# Patient Record
Sex: MALE | Race: WHITE | Employment: OTHER | URBAN - METROPOLITAN AREA
[De-identification: names, ages, dates, MRNs, and addresses within clinical notes are randomized per-mention and may not be internally consistent; named-entity substitution may affect disease eponyms.]

---

## 2019-01-16 ENCOUNTER — INITIAL CONSULT (OUTPATIENT)
Dept: ONCOLOGY | Age: 64
End: 2019-01-16
Payer: COMMERCIAL

## 2019-01-16 DIAGNOSIS — C61 PROSTATE CANCER (HCC): Primary | ICD-10-CM

## 2019-01-16 DIAGNOSIS — Z80.42 FAMILY HISTORY OF PROSTATE CANCER: ICD-10-CM

## 2019-01-16 PROCEDURE — 96040 PR GENETIC COUNSELING, EACH 30 MIN: CPT | Performed by: GENETIC COUNSELOR, MS

## 2019-02-04 ENCOUNTER — TELEPHONE (OUTPATIENT)
Dept: ONCOLOGY | Age: 64
End: 2019-02-04

## 2019-02-05 ENCOUNTER — HOSPITAL ENCOUNTER (OUTPATIENT)
Facility: MEDICAL CENTER | Age: 64
End: 2019-02-05
Payer: COMMERCIAL

## 2019-02-06 ENCOUNTER — OFFICE VISIT (OUTPATIENT)
Dept: ONCOLOGY | Age: 64
End: 2019-02-06
Payer: COMMERCIAL

## 2019-02-06 DIAGNOSIS — Z80.42 FAMILY HISTORY OF PROSTATE CANCER: ICD-10-CM

## 2019-02-06 DIAGNOSIS — C61 PROSTATE CANCER (HCC): Primary | ICD-10-CM

## 2019-02-06 DIAGNOSIS — Z15.03 BRCA2 GENE MUTATION POSITIVE IN MALE: ICD-10-CM

## 2019-02-06 DIAGNOSIS — Z15.01 BRCA2 GENE MUTATION POSITIVE IN MALE: ICD-10-CM

## 2019-02-06 DIAGNOSIS — Z15.09 BRCA2 GENE MUTATION POSITIVE IN MALE: ICD-10-CM

## 2019-02-06 PROCEDURE — 96040 PR GENETIC COUNSELING, EACH 30 MIN: CPT | Performed by: GENETIC COUNSELOR, MS

## 2019-06-18 ENCOUNTER — TELEPHONE (OUTPATIENT)
Dept: ONCOLOGY | Age: 64
End: 2019-06-18

## 2019-06-18 NOTE — TELEPHONE ENCOUNTER
Received call from patient's daughter who was a patient of mine. She is asking if I can help her get a 2nd opinion for her father Herberth Marin. She left me her mother Chari's number. I called Chari and spoke with her. Explained who I am and what Herson Carson had told me. Keisha Bajwa relates she would like a second opinion at the [facility] with Sarita's doctors. I explained what the med onc and rad onc do and gave her Dr. Amira Mederos and Dr. Praag Carias names as the med onc and rad onc that Sarita saw. I explained that a  would call her tomorrow with appointment date and time and that we will try to set up dual appointments. Keisha Bajwa is thankful.   She relates she works until 2:00pm so if we could call after 2:30pm would be best.

## 2019-06-20 ENCOUNTER — TELEPHONE (OUTPATIENT)
Dept: RADIATION ONCOLOGY | Age: 64
End: 2019-06-20

## 2019-06-20 ENCOUNTER — TELEPHONE (OUTPATIENT)
Dept: ONCOLOGY | Age: 64
End: 2019-06-20

## 2019-06-20 NOTE — TELEPHONE ENCOUNTER
Per Pam Rahman nurse navigator at [facility] pt is seeking a second opinion for his prostate ca. I called to schedule pt no answer message left. Also pt has not records in our system need additional information.

## 2019-06-20 NOTE — TELEPHONE ENCOUNTER
Called patients home number trying to reach Taisha. I was actually calling his cell phone. I explained who I am and why I was calling. He asked me to call his wife, she keeps his schedule. I called Chari left message and apologized for calling Yordan's cell instead of hers. I explained that we can see him on 6/28/19 med onc and rad onc at [facility].  1130 with rad onc and will follow with med onc. I also let her know that Donte Rosales said he works 6/28/19. He related he is off tomorrow and Monday. I think I could get him in on Monday with med onc but I do not think I will be able to do the rad onc appointment.    I told her I would call her back tomorrow

## 2019-06-27 ENCOUNTER — TELEPHONE (OUTPATIENT)
Dept: ONCOLOGY | Age: 64
End: 2019-06-27

## 2019-07-09 ENCOUNTER — TELEPHONE (OUTPATIENT)
Dept: ONCOLOGY | Age: 64
End: 2019-07-09

## 2019-07-23 ENCOUNTER — TELEPHONE (OUTPATIENT)
Dept: ONCOLOGY | Age: 64
End: 2019-07-23

## 2020-08-12 ENCOUNTER — OFFICE VISIT (OUTPATIENT)
Dept: CARDIOLOGY CLINIC | Age: 65
End: 2020-08-12
Payer: MEDICARE

## 2020-08-12 ENCOUNTER — HOSPITAL ENCOUNTER (OUTPATIENT)
Dept: NON INVASIVE DIAGNOSTICS | Age: 65
Discharge: HOME | End: 2020-08-12
Payer: MEDICARE

## 2020-08-12 VITALS
OXYGEN SATURATION: 96 % | HEART RATE: 60 BPM | DIASTOLIC BLOOD PRESSURE: 80 MMHG | SYSTOLIC BLOOD PRESSURE: 140 MMHG | WEIGHT: 224 LBS

## 2020-08-12 PROCEDURE — 1036F TOBACCO NON-USER: CPT | Performed by: INTERNAL MEDICINE

## 2020-08-12 PROCEDURE — G8421 BMI NOT CALCULATED: HCPCS | Performed by: INTERNAL MEDICINE

## 2020-08-12 PROCEDURE — 3017F COLORECTAL CA SCREEN DOC REV: CPT | Performed by: INTERNAL MEDICINE

## 2020-08-12 PROCEDURE — 99203 OFFICE O/P NEW LOW 30 MIN: CPT | Performed by: INTERNAL MEDICINE

## 2020-08-12 PROCEDURE — 1123F ACP DISCUSS/DSCN MKR DOCD: CPT | Performed by: INTERNAL MEDICINE

## 2020-08-12 PROCEDURE — 93270 REMOTE 30 DAY ECG REV/REPORT: CPT

## 2020-08-12 PROCEDURE — G8427 DOCREV CUR MEDS BY ELIG CLIN: HCPCS | Performed by: INTERNAL MEDICINE

## 2020-08-12 PROCEDURE — 4040F PNEUMOC VAC/ADMIN/RCVD: CPT | Performed by: INTERNAL MEDICINE

## 2020-08-12 RX ORDER — SIMVASTATIN 20 MG
20 TABLET ORAL NIGHTLY
COMMUNITY

## 2020-08-12 RX ORDER — ASPIRIN 81 MG/1
81 TABLET ORAL DAILY
COMMUNITY

## 2020-08-12 RX ORDER — VENLAFAXINE HYDROCHLORIDE 150 MG/1
150 TABLET, EXTENDED RELEASE ORAL
COMMUNITY

## 2020-08-12 RX ORDER — QUETIAPINE FUMARATE 100 MG/1
300 TABLET, FILM COATED ORAL NIGHTLY
COMMUNITY

## 2020-08-12 RX ORDER — GABAPENTIN 300 MG/1
300 CAPSULE ORAL 2 TIMES DAILY
COMMUNITY
End: 2021-10-27

## 2020-08-12 RX ORDER — ENZALUTAMIDE 40 MG/1
CAPSULE ORAL
COMMUNITY

## 2020-08-12 RX ORDER — MORPHINE SULFATE 30 MG/1
30 TABLET ORAL EVERY 4 HOURS PRN
COMMUNITY

## 2020-08-12 RX ORDER — OXYCODONE HYDROCHLORIDE AND ACETAMINOPHEN 5; 325 MG/1; MG/1
1 TABLET ORAL EVERY 4 HOURS PRN
COMMUNITY

## 2020-08-12 RX ORDER — LANOLIN ALCOHOL/MO/W.PET/CERES
10 CREAM (GRAM) TOPICAL NIGHTLY
COMMUNITY

## 2020-08-12 RX ORDER — TRAZODONE HYDROCHLORIDE 150 MG/1
200 TABLET ORAL NIGHTLY
COMMUNITY

## 2020-08-12 RX ORDER — HYDROCHLOROTHIAZIDE 25 MG/1
25 TABLET ORAL DAILY
COMMUNITY

## 2020-08-12 RX ORDER — SODIUM BICARBONATE 650 MG/1
650 TABLET ORAL 2 TIMES DAILY
COMMUNITY

## 2020-08-12 RX ORDER — AMLODIPINE BESYLATE 10 MG/1
10 TABLET ORAL DAILY
COMMUNITY

## 2020-08-12 SDOH — HEALTH STABILITY: MENTAL HEALTH: HOW OFTEN DO YOU HAVE A DRINK CONTAINING ALCOHOL?: NEVER

## 2020-08-12 NOTE — PROGRESS NOTES
The patient and wife were educated on the use of an event monitor. The patient's comprehension was high. The patient was able to verbalize recall. The patient was instructed on how and when to return the monitor.

## 2020-08-12 NOTE — PROGRESS NOTES
Ov DR Kerry Henderson   Establish care  C/o sharp pain in chest   Lt arm comes and goes  When gets up has   Some dizziness especially  With pain   Just finished a week of  Radiation. Pain lower back down legs. Being treated for prostate cancer  With mets to bones. Getting palliative care    Getting propolia every 6 weeks. Needing cleared to do radiation to   Ribs. Has sleep apnea uses cpap  Pt children Austen living in [facility] works for   [phone] physical therapy in [place],  [facility] disable eye cancer , Kasandra May worked at [employer] [employer] let go. .  Will do stress test and event  Monitor.   Bedside echo done  Will do lexiscan stress test   And event monitor   Call with event monitor results  Will see pt after stress test   To give results

## 2020-08-13 NOTE — PROGRESS NOTES
Sara Fletcher M.D. [address] [place], [place] [address]   [phone]        2020        [facility]  [address] [facility]  [place], [address]    RE:   Aria Landry  :  1955    Dear Dmitriy Orozco:    CHIEF COMPLAINT:  1. Chest pain. 2.  Lightheadedness with syncopal episodes. 3.  Coronary artery disease. HISTORY OF PRESENT ILLNESS:  I had the pleasure of seeing Mr. Lisa Dhaliwal and his wife, Jono Rodriguez, in our office on 2020. He is a pleasant 78-year-old gentleman who has a history of cardiac catheterization approximately 20 years ago in [place], where he had approximately 50% disease in the LAD. He has had no subsequent cardiac catheterizations. He was found to have prostate surgery, diagnosed on 2018. He was placed on EBRT radiation from 2018 to 2018, with another additional 21 days of radiation. He then had androgen deprivation therapy with Eligard 45 mcg subcu every 6 months per Dr. Carlos Beltran, which started after radiation. On 2019, his PSA elevated and he was found to have metastatic disease to bones on CT and bone scan in 2019. He was started on Zytiga and was transferred to the care of Dr. Sissy Vicente on 2019. He was placed on enzalutamide 160 mg daily in 2019 and placed on hold on 2019, due to intractable fatigue and hot flashes and dizziness. He then had Taxotere treatment on , but this was stopped and he was resumed on enzalutamide 120 mg on . He was admitted to [facility] on 2019, with numbness and chest pain. His EKG at that time showed anterior T-wave inversion suggesting the possibility of ischemia. His enzymes, however, were negative. He was discharged 1 day later with the intent of doing an outpatient stress test.  He went home and the stress test was never done.     Following his hospitalization, he was placed on enzalutamide on 06/17/2019, and had monthly Xgeva 120 mg every 6 weeks, starting on 02/27/2020. He had progression of his metastatic disease to his back with osteoblastic lesions on the clavicle and the left scapula, manubrium, ribs, thoracolumbar spine, pelvis on a CT scan on 06/15/2020. He saw Dr. Milagros Mora on 06/17/2020, and his wife reported that he had had several episodes of \"passing out. \"  He also had multiple episodes of dizziness. He denied palpitations. He had developed some chest pain but it had atypical characteristics. Because of his syncopal episodes, Dr. Milagros Mora had recommended cardiac evaluation. He and his wife chose to come to see me. Mr. Rafa Abdi is in a fair amount of pain and is on palliative care for pain management. He is able to do some walking at home. He has had no further syncopal episodes but he has had multiple episodes of lightheadedness and dizziness, which last for 10 to 15 minutes then subside. He has had no PND, orthopnea and no pedal edema. Again, he cannot feel any palpitations. He had radiation of his right and left iliac spine on 08/03/2020 to 08/07/2020 for pain in his back. He just finished radiation on his back. He is needing to have radiation to his ribs but needs to be seen and cleared per us from a cardiac standpoint. CARDIAC RISK FACTORS:  Known CAD:  Positive. Hypertension:  Positive. Diabetes:  Positive, non-insulin-dependent. Hyperlipidemia:  Positive. Smoking:  Negative. Other Family Members:  Positive. Peripheral Vascular Disease:  Negative. MEDICATIONS AT THIS TIME:  He is on Norvasc 10 mg daily, aspirin 81 mg daily, enzalutamide (Xtandi) 40 mg daily, Neurontin 300 mg t.i.d., HydroDIURIL 25 mg daily, metformin 500 mg 2 tablets b.i.d., morphine tablets p.r.n., Percocet p.r.n., Seroquel 100 mg daily, Zocor 20 mg daily, sodium bicarbonate 650 mg q.i.d., Desyrel 150 mg nightly, and venlafaxine 150 mg daily.     PAST MEDICAL S2 were normal.  No S3 or S4. Soft systolic blowing type murmur. No diastolic murmur. PMI was normal.  No lift, thrust, or pericardial friction rub. LUNGS:  Quite clear to auscultation and percussion. ABDOMEN:  Soft and nontender. Good bowel sounds. The aorta was not enlarged. No hepatomegaly, splenomegaly. EXTREMITIES:  Good femoral pulses. Good pedal pulses. No pedal edema. Skin was warm and dry. No calf tenderness. Nail beds pink. Good cap refill. PULSES:  Bilateral symmetrical radial, brachial and carotid pulses. No carotid bruits. Good femoral and pedal pulses. NEUROLOGIC EXAM:  Within normal limits. PSYCHIATRIC EXAM:  Within normal limits. LABORATORY DATA:  From 07/29/2020, sodium 135, potassium 4.6. AST was 20, ALT was 19. PSA was 1.820. Glucose 108, BUN 16, creatinine 0.94. I do not have his latest CBC. Hemoglobin A1c was 6.0. Cholesterol was 204 with HDL of 43, triglycerides 250, . His EKG showed sinus rhythm with T-wave inversion in V1 through V3, which has been present and is an old finding, no acute changes. CT scan on 06/15/2020, showed bony metastases on right ischial tuberosity, left femoral neck, iliac bones bilaterally, throughout the thoracolumbar spine, ribs bilaterally with a focal metastasis point on the clavicle and left scapula, also showed ill-defined ground-glass attenuation on the left lung apex, which had been present previously. I did a beside echocardiogram.  LV appeared to be of normal size and normal function. Ejection fraction in the 50% to 55% range. I had difficulty seeing his right-sided chambers, although they appeared to be mildly dilated from his underlying sleep apnea. IMPRESSION:  1. Episodes of syncope in June, associated with multiple episodes of dizziness, which continued, lasting for 10 to 15 minutes, rule out arrhythmia.    2.  Chest pain, quite atypical and most likely musculoskeletal, although with his known coronary artery disease, cannot rule out an element of angina. 3.  Status post cardiac catheterization 20 years ago in [place], with approximately 50% LAD lesion, with unremarkable right coronary artery and circumflex, and normal LV function. 4.  Chest pain in [place] on 08/30/2019, with negative enzymes and EKG unchanged with T-wave inversion, V1, V2 and V3, which he continues to have, with recommendation for a stress test as an outpatient, which was not done. 5.  Non-insulin-dependent diabetes, under excellent control, with hemoglobin A1c of 6.0.  6.  Hypertension, well controlled. 7.  Hyperlipidemia, well controlled. 8.  Prostate cancer, being found on 04/20/2018, with bone metastases throughout multiple sites in the iliac spine, left clavicle, left scapula, and his ribs. 9.  Status post 44 treatments of radiation from April until 09/2018. 10.  Metastases to his bone on CT and bone scan, discovered in 06/2019. 11. On chemotherapy. 12.  Severe rib pain, spine pain, with him having radiation from 08/03 through 08/07, with an intent of doing radiation to his ribs if cleared by myself. 13.  Normal LV function. PLAN:  1. Recommend a 30-day event recorder. 2.  We will do a Lexiscan stress test to see if there is underlying ischemia, which will help us with treatment of his chest pain. 3.  He is cleared to have continued radiation to his ribs from a cardiac standpoint. DISCUSSION:  Mr. Smita Johnson is quite complex. He does have documented coronary artery disease, although it was mild 20 years ago on his catheterization. His main issue, of course, is his prostate cancer with metastases to multiple bone sites, in his spine, iliac, femur, scapula, clavicle. He is now on palliative care for pain control and is undergoing radiation to his spine and ribs for pain control. He did have episodes of syncope in June with multiple episodes of lightheadedness and dizziness.   Of course, the possibility of arrhythmia is high, and therefore, we will do a 30-day event recorder. Again, his chest pain is quite atypical, but it will be helpful to know if he has underlying ischemia or if he appears to have normal perfusion of his myocardium. If his stress test is abnormal, we would add a very low dose of nitrate such as a half a tablet of Imdur daily. I will have his wife take his blood pressure at home. If he would have a lightheaded spell, having her take the blood pressure during that time would also be helpful to know whether these are episodes of hypotension or whether his blood pressure is good during that time. He appears to have normal LV function on his beside echocardiogram.  We, of course, would not do any invasive procedures. We are attempting to identify any cardiac reason for his lightheaded episodes, such as arrhythmia, to help us with his management and also attempting to see if there is ischemia present, to help with medications. There is no problem from a cardiac standpoint, with him continuing with radiation to his ribs for pain control. The event recorder will be placed today. We will schedule the Lexiscan stress test and I will meet with him afterwards to go over the results. Thank you very much for allowing me the privilege of seeing Mr. Jun Hdz. I will have a followup letter following his 30-day event recorder and his Lexiscan stress test, any further recommendations from a cardiac standpoint. If you have any questions regarding my thoughts, please do not hesitate to contact me. Sincerely,        Juan Barrera    D: 08/13/2020 3:24:04     T: 08/13/2020 6:46:19     [email or web address]  Job#: [id number]   Doc#: [id number]      CC:  Eugenia GARRETT  [address]

## 2020-08-19 ENCOUNTER — HOSPITAL ENCOUNTER (OUTPATIENT)
Dept: NUCLEAR MEDICINE | Age: 65
Discharge: HOME | End: 2020-08-21
Payer: MEDICARE

## 2020-08-19 ENCOUNTER — HOSPITAL ENCOUNTER (OUTPATIENT)
Dept: NON INVASIVE DIAGNOSTICS | Age: 65
Discharge: HOME | End: 2020-08-19
Payer: MEDICARE

## 2020-08-19 ENCOUNTER — OFFICE VISIT (OUTPATIENT)
Dept: CARDIOLOGY CLINIC | Age: 65
End: 2020-08-19
Payer: MEDICARE

## 2020-08-19 VITALS — HEART RATE: 58 BPM | DIASTOLIC BLOOD PRESSURE: 66 MMHG | SYSTOLIC BLOOD PRESSURE: 142 MMHG

## 2020-08-19 PROCEDURE — G8427 DOCREV CUR MEDS BY ELIG CLIN: HCPCS | Performed by: INTERNAL MEDICINE

## 2020-08-19 PROCEDURE — 93017 CV STRESS TEST TRACING ONLY: CPT

## 2020-08-19 PROCEDURE — 1123F ACP DISCUSS/DSCN MKR DOCD: CPT | Performed by: INTERNAL MEDICINE

## 2020-08-19 PROCEDURE — 6360000002 HC RX W HCPCS: Performed by: INTERNAL MEDICINE

## 2020-08-19 PROCEDURE — 3430000000 HC RX DIAGNOSTIC RADIOPHARMACEUTICAL: Performed by: INTERNAL MEDICINE

## 2020-08-19 PROCEDURE — 1036F TOBACCO NON-USER: CPT | Performed by: INTERNAL MEDICINE

## 2020-08-19 PROCEDURE — 78452 HT MUSCLE IMAGE SPECT MULT: CPT

## 2020-08-19 PROCEDURE — A9500 TC99M SESTAMIBI: HCPCS | Performed by: INTERNAL MEDICINE

## 2020-08-19 PROCEDURE — 4040F PNEUMOC VAC/ADMIN/RCVD: CPT | Performed by: INTERNAL MEDICINE

## 2020-08-19 PROCEDURE — 99212 OFFICE O/P EST SF 10 MIN: CPT | Performed by: INTERNAL MEDICINE

## 2020-08-19 PROCEDURE — 93225 XTRNL ECG REC<48 HRS REC: CPT

## 2020-08-19 PROCEDURE — 3017F COLORECTAL CA SCREEN DOC REV: CPT | Performed by: INTERNAL MEDICINE

## 2020-08-19 PROCEDURE — G8421 BMI NOT CALCULATED: HCPCS | Performed by: INTERNAL MEDICINE

## 2020-08-19 PROCEDURE — 2580000003 HC RX 258: Performed by: INTERNAL MEDICINE

## 2020-08-19 PROCEDURE — 93226 XTRNL ECG REC<48 HR SCAN A/R: CPT

## 2020-08-19 PROCEDURE — 78452 HT MUSCLE IMAGE SPECT MULT: CPT | Performed by: INTERNAL MEDICINE

## 2020-08-19 RX ORDER — AMINOPHYLLINE DIHYDRATE 25 MG/ML
50 INJECTION, SOLUTION INTRAVENOUS
Status: DISCONTINUED | OUTPATIENT
Start: 2020-08-19 | End: 2020-08-19 | Stop reason: HOSPADM

## 2020-08-19 RX ORDER — 0.9 % SODIUM CHLORIDE 0.9 %
10 VIAL (ML) INJECTION PRN
Status: DISCONTINUED | OUTPATIENT
Start: 2020-08-19 | End: 2020-08-20 | Stop reason: HOSPADM

## 2020-08-19 RX ORDER — AMINOPHYLLINE DIHYDRATE 25 MG/ML
100 INJECTION, SOLUTION INTRAVENOUS
Status: DISCONTINUED | OUTPATIENT
Start: 2020-08-19 | End: 2020-08-19 | Stop reason: HOSPADM

## 2020-08-19 RX ORDER — ISOSORBIDE MONONITRATE 30 MG/1
15 TABLET, EXTENDED RELEASE ORAL DAILY
Qty: 30 TABLET | Refills: 11 | Status: SHIPPED | OUTPATIENT
Start: 2020-08-19 | End: 2021-06-15 | Stop reason: SDUPTHER

## 2020-08-19 RX ADMIN — TETRAKIS(2-METHOXYISOBUTYLISOCYANIDE)COPPER(I) TETRAFLUOROBORATE 10 MILLICURIE: 1 INJECTION, POWDER, LYOPHILIZED, FOR SOLUTION INTRAVENOUS at 10:36

## 2020-08-19 RX ADMIN — Medication 10 ML: at 10:59

## 2020-08-19 RX ADMIN — TETRAKIS(2-METHOXYISOBUTYLISOCYANIDE)COPPER(I) TETRAFLUOROBORATE 30 MILLICURIE: 1 INJECTION, POWDER, LYOPHILIZED, FOR SOLUTION INTRAVENOUS at 10:36

## 2020-08-19 RX ADMIN — REGADENOSON 0.4 MG: 0.08 INJECTION, SOLUTION INTRAVENOUS at 10:59

## 2020-08-19 NOTE — PROCEDURES
[place] [address]                              CARDIAC STRESS TEST    PATIENT NAME: Bessie Zurita                  :        1955  MED REC NO:   [id number]                              ROOM:  ACCOUNT NO:   [de-identified]                           ADMIT DATE: 2020  PROVIDER:     Dayna Machuca Benjamin [place] OF STUDY:  2020    Cardiovascular Diagnostics Department    Ordering Provider:  Luli Mirza MD    Primary Care Provider:  Vesta Rangel NP    Interpreting Physician:  Dayna Han MD    MYOCARDIAL PERFUSION STRESS IMAGING    The stress ECG results are reported separately. NUCLEAR IMAGING RESULTS:  The overall quality of the study is fair. No  significant attenuation artifact was seen. There is no evidence of  abnormal lung uptake. Additionally, the right ventricle appears normal.  The left ventricular cavity is noted to be normal in size on stress  images. There is evidence of transient ischemic dilatation (TID) of the  left ventricle (1.28). Gated SPECT imaging demonstrates hypokinesis of the inferior region. The calculated left ventricular ejection fraction was 48%. The rest images demonstrated a small/moderate perfusion abnormality of  mild/moderate intensity in the inferior region(s) which may be due to  artifact. On stress imaging, a small/moderate perfusion abnormality of  mild/moderate intensity was noted in the inferior region(s) which may be  due to artifact. IMPRESSION:  1. Abnormal myocardial perfusion:  Relatively fixed perfusion defect of  the inferior region with no significant ischemia, most consistent with  myocardial infarction.     In addition, transient ischemic dilatation (TID) of the left ventricle  is seen, which can be seen with uncontrolled hypertension, severe left  main coronary artery disease or severe three-vessel coronary artery  disease (TID 1.28). 2.  Global left ventricular systolic function was abnormal with an EF of  48% with regional wall motion abnormalities. Overall these results are most consistent with an intermediate to high  risk scan. Additional testing including cardiac catheterization may be indicated. The results of this test were discussed with Dr. Otoniel Moore on 08/19/2020  at 1230. ISAC MONTANA    D: 08/19/2020 13:45:40       T: 08/19/2020 13:47:16     [email or web address]  Job#: [id number]     Doc#: Unknown    CC:  Carlitos Ibarra

## 2020-08-19 NOTE — PROGRESS NOTES
Ov DR Miguel Miranda follow up   To review stress test.   And strips from event monitor. Has had 2 episodes near syncope   Twice since having monitor on   Last Sun Aug 16 maybe 15th. bp then at home 120/70. Will give imdur 30mg 1/2 tab   Daily. Pt is cleared to do radiation. Will work with bp and heart   Rate over the phone. See prn.

## 2020-08-19 NOTE — PROGRESS NOTES
Procedure complete. Patient tolerated well. Patient denies any shortness of breath or chest pain. Reports mild nausea. Drink and snack provided.

## 2020-08-25 NOTE — PROCEDURES
[place] [address]                              CARDIAC STRESS TEST    PATIENT NAME: Marbella Carrizales                  :        1955  MED REC NO:   [id number]                              ROOM:  ACCOUNT NO:   [de-identified]                           ADMIT DATE: 2020  PROVIDER:     Og Willoughby      DATE OF STUDY:  2020    LEXISCAN CARDIOLITE STRESS TEST:    INDICATION:  Chest pain. IMPRESSION:  1. We gave 0.4 mg of Lexiscan intravenously. 2.  This was followed in 20 seconds by Cardiolite infusion. 3.  There was no chest pain. 4.  There was no ST depression. 5.  It was an overall negative Lexiscan stress test.  6.  Cardiolite to follow.         Reford Gone    D: 2020 4:35:15       T: 2020 5:27:37     [email or web address]  Job#: [id number]     Doc#: [id number]    CC:  Saman Capone

## 2020-09-15 NOTE — PROCEDURES
[place] [address]                                 EVENT MONITOR    PATIENT NAME: Harjinder Kohli                  :        1955  MED REC NO:   [id number]                              ROOM:  ACCOUNT NO:   [de-identified]                           ADMIT DATE: 2020  PROVIDER:     Brissa People    TEST TYPE:  EVENT RECORDER    INDICATION FOR STUDY: Syncope. INTERPRETATION:  He wore the event recorder from 2020 to  2020. We received 46 strip counts with 20 being symptomatic and 26 being  asymptomatic. In general, he remained in sinus rhythm throughout the recording. His  highest heart rate was 120 with his lowest heart rate 47. Average heart  rate 62. He had rare PACs and PVCs. He had one short run of junctional  rhythm at 110 beats per minute, was just 4 beats. Otherwise, he had no  further arrhythmias. His symptoms of lightheadedness and near syncope were not associated  with any arrhythmias. This is overall an unremarkable event recorder  showing no significant arrhythmias and no significant bradycardia or  tachycardia. Again, his rhythm remained at sinus at 70 beats per minute  when he was having his near syncopal episodes.         Anabel Baker    D: 09/15/2020 8:25:31       T: 09/15/2020 8:30:27     [email or web address]  Job#: [id number]     Doc#: [id number]    CC:

## 2021-06-15 ENCOUNTER — TELEPHONE (OUTPATIENT)
Dept: CARDIOLOGY CLINIC | Age: 66
End: 2021-06-15

## 2021-06-15 RX ORDER — ISOSORBIDE MONONITRATE 30 MG/1
30 TABLET, EXTENDED RELEASE ORAL DAILY
Qty: 30 TABLET | Refills: 11 | Status: SHIPPED | OUTPATIENT
Start: 2021-06-15 | End: 2021-10-27 | Stop reason: SDUPTHER

## 2021-06-15 NOTE — TELEPHONE ENCOUNTER
Wife called stating he is having cp with extertion and will like to know if she can increase his Imdur 15 mg qd to 30 mg qd ?   Wife states he has cancer therefore \"a lot\" going on

## 2021-10-27 ENCOUNTER — APPOINTMENT (OUTPATIENT)
Dept: GENERAL RADIOLOGY | Age: 66
End: 2021-10-27
Payer: MEDICARE

## 2021-10-27 ENCOUNTER — HOSPITAL ENCOUNTER (EMERGENCY)
Age: 66
Discharge: HOME | End: 2021-10-27
Attending: FAMILY MEDICINE
Payer: MEDICARE

## 2021-10-27 VITALS
HEIGHT: 68 IN | RESPIRATION RATE: 13 BRPM | DIASTOLIC BLOOD PRESSURE: 70 MMHG | TEMPERATURE: 98.4 F | BODY MASS INDEX: 33.34 KG/M2 | WEIGHT: 220 LBS | OXYGEN SATURATION: 96 % | SYSTOLIC BLOOD PRESSURE: 145 MMHG | HEART RATE: 57 BPM

## 2021-10-27 DIAGNOSIS — R07.89 ATYPICAL CHEST PAIN: Primary | ICD-10-CM

## 2021-10-27 LAB
ABSOLUTE EOS #: 0.2 K/UL (ref 0–0.4)
ABSOLUTE IMMATURE GRANULOCYTE: ABNORMAL K/UL (ref 0–0.3)
ABSOLUTE LYMPH #: 0.7 K/UL (ref 1–4.8)
ABSOLUTE MONO #: 0.6 K/UL (ref 0–1)
ANION GAP SERPL CALCULATED.3IONS-SCNC: 11 MMOL/L (ref 9–17)
BASOPHILS # BLD: 0 % (ref 0–2)
BASOPHILS ABSOLUTE: 0 K/UL (ref 0–0.2)
BNP INTERPRETATION: NORMAL
BUN BLDV-MCNC: 16 MG/DL (ref 8–23)
BUN/CREAT BLD: 16 (ref 9–20)
CALCIUM SERPL-MCNC: 8.5 MG/DL (ref 8.6–10.4)
CHLORIDE BLD-SCNC: 105 MMOL/L (ref 98–107)
CO2: 23 MMOL/L (ref 20–31)
CREAT SERPL-MCNC: 1.01 MG/DL (ref 0.7–1.2)
DIFFERENTIAL TYPE: YES
EOSINOPHILS RELATIVE PERCENT: 4 % (ref 0–5)
GFR AFRICAN AMERICAN: >60 ML/MIN
GFR NON-AFRICAN AMERICAN: >60 ML/MIN
GFR SERPL CREATININE-BSD FRML MDRD: ABNORMAL ML/MIN/{1.73_M2}
GFR SERPL CREATININE-BSD FRML MDRD: ABNORMAL ML/MIN/{1.73_M2}
GLUCOSE BLD-MCNC: 81 MG/DL (ref 70–99)
HCT VFR BLD CALC: 29 % (ref 41–53)
HEMOGLOBIN: 9.7 G/DL (ref 13.5–17.5)
IMMATURE GRANULOCYTES: ABNORMAL %
LYMPHOCYTES # BLD: 13 % (ref 13–44)
MCH RBC QN AUTO: 29 PG (ref 26–34)
MCHC RBC AUTO-ENTMCNC: 33.4 G/DL (ref 31–37)
MCV RBC AUTO: 87 FL (ref 80–100)
MONOCYTES # BLD: 11 % (ref 5–9)
NRBC AUTOMATED: ABNORMAL PER 100 WBC
PDW BLD-RTO: 14 % (ref 12.1–15.2)
PLATELET # BLD: 237 K/UL (ref 140–450)
PLATELET ESTIMATE: ABNORMAL
PMV BLD AUTO: ABNORMAL FL (ref 6–12)
POTASSIUM SERPL-SCNC: 4.6 MMOL/L (ref 3.7–5.3)
PRO-BNP: 148 PG/ML
RBC # BLD: 3.33 M/UL (ref 4.5–5.9)
RBC # BLD: ABNORMAL 10*6/UL
SEG NEUTROPHILS: 72 % (ref 39–75)
SEGMENTED NEUTROPHILS ABSOLUTE COUNT: 3.9 K/UL (ref 2.1–6.5)
SODIUM BLD-SCNC: 139 MMOL/L (ref 135–144)
TROPONIN INTERP: NORMAL
TROPONIN T: NORMAL NG/ML
TROPONIN, HIGH SENSITIVITY: 15 NG/L (ref 0–22)
TROPONIN, HIGH SENSITIVITY: 16 NG/L (ref 0–22)
TROPONIN, HIGH SENSITIVITY: 17 NG/L (ref 0–22)
WBC # BLD: 5.4 K/UL (ref 3.5–11)
WBC # BLD: ABNORMAL 10*3/UL

## 2021-10-27 PROCEDURE — 99285 EMERGENCY DEPT VISIT HI MDM: CPT

## 2021-10-27 PROCEDURE — 80048 BASIC METABOLIC PNL TOTAL CA: CPT

## 2021-10-27 PROCEDURE — 36415 COLL VENOUS BLD VENIPUNCTURE: CPT

## 2021-10-27 PROCEDURE — 6370000000 HC RX 637 (ALT 250 FOR IP): Performed by: FAMILY MEDICINE

## 2021-10-27 PROCEDURE — 84484 ASSAY OF TROPONIN QUANT: CPT

## 2021-10-27 PROCEDURE — 83880 ASSAY OF NATRIURETIC PEPTIDE: CPT

## 2021-10-27 PROCEDURE — 93005 ELECTROCARDIOGRAM TRACING: CPT | Performed by: FAMILY MEDICINE

## 2021-10-27 PROCEDURE — 85025 COMPLETE CBC W/AUTO DIFF WBC: CPT

## 2021-10-27 PROCEDURE — 71045 X-RAY EXAM CHEST 1 VIEW: CPT

## 2021-10-27 RX ORDER — ASPIRIN 81 MG/1
324 TABLET, CHEWABLE ORAL ONCE
Status: COMPLETED | OUTPATIENT
Start: 2021-10-27 | End: 2021-10-27

## 2021-10-27 RX ORDER — FEXOFENADINE HCL 180 MG/1
180 TABLET ORAL DAILY
COMMUNITY
Start: 2019-12-04

## 2021-10-27 RX ORDER — SENNA AND DOCUSATE SODIUM 50; 8.6 MG/1; MG/1
1 TABLET, FILM COATED ORAL 2 TIMES DAILY
COMMUNITY
Start: 2019-12-04

## 2021-10-27 RX ORDER — ISOSORBIDE MONONITRATE 30 MG/1
30 TABLET, EXTENDED RELEASE ORAL 2 TIMES DAILY
Qty: 60 TABLET | Refills: 1 | Status: SHIPPED | OUTPATIENT
Start: 2021-10-27 | End: 2021-12-20 | Stop reason: SDUPTHER

## 2021-10-27 RX ADMIN — ASPIRIN 81 MG CHEWABLE TABLET 324 MG: 81 TABLET CHEWABLE at 08:35

## 2021-10-27 ASSESSMENT — PAIN DESCRIPTION - ORIENTATION: ORIENTATION: LEFT

## 2021-10-27 ASSESSMENT — PAIN DESCRIPTION - ONSET: ONSET: ON-GOING

## 2021-10-27 ASSESSMENT — PAIN DESCRIPTION - DESCRIPTORS: DESCRIPTORS: CONSTANT

## 2021-10-27 ASSESSMENT — PAIN DESCRIPTION - FREQUENCY: FREQUENCY: CONTINUOUS

## 2021-10-27 ASSESSMENT — PAIN DESCRIPTION - LOCATION: LOCATION: ARM

## 2021-10-27 ASSESSMENT — PAIN SCALES - GENERAL
PAINLEVEL_OUTOF10: 5
PAINLEVEL_OUTOF10: 10

## 2021-10-27 ASSESSMENT — PAIN DESCRIPTION - PROGRESSION: CLINICAL_PROGRESSION: GRADUALLY WORSENING

## 2021-10-27 ASSESSMENT — PAIN DESCRIPTION - PAIN TYPE: TYPE: ACUTE PAIN

## 2021-10-28 LAB
EKG ATRIAL RATE: 49 BPM
EKG ATRIAL RATE: 50 BPM
EKG ATRIAL RATE: 55 BPM
EKG P AXIS: 55 DEGREES
EKG P AXIS: 64 DEGREES
EKG P AXIS: 77 DEGREES
EKG P-R INTERVAL: 168 MS
EKG P-R INTERVAL: 172 MS
EKG P-R INTERVAL: 176 MS
EKG Q-T INTERVAL: 424 MS
EKG Q-T INTERVAL: 438 MS
EKG Q-T INTERVAL: 456 MS
EKG QRS DURATION: 88 MS
EKG QRS DURATION: 90 MS
EKG QRS DURATION: 90 MS
EKG QTC CALCULATION (BAZETT): 399 MS
EKG QTC CALCULATION (BAZETT): 405 MS
EKG QTC CALCULATION (BAZETT): 411 MS
EKG R AXIS: 53 DEGREES
EKG R AXIS: 54 DEGREES
EKG R AXIS: 68 DEGREES
EKG T AXIS: 28 DEGREES
EKG T AXIS: 29 DEGREES
EKG T AXIS: 55 DEGREES
EKG VENTRICULAR RATE: 49 BPM
EKG VENTRICULAR RATE: 50 BPM
EKG VENTRICULAR RATE: 55 BPM

## 2021-10-28 PROCEDURE — 93010 ELECTROCARDIOGRAM REPORT: CPT | Performed by: INTERNAL MEDICINE

## 2021-10-28 ASSESSMENT — HEART SCORE: ECG: 1

## 2021-10-28 ASSESSMENT — ENCOUNTER SYMPTOMS
NAUSEA: 0
SHORTNESS OF BREATH: 0

## 2021-10-28 NOTE — ED PROVIDER NOTES
[facility]  eMERGENCY dEPARTMENT eNCOUnter          [facility]       Chief Complaint   Patient presents with    Chest Pain     started this morning with left arm pain and left chest pain       Nurses Notes reviewed and I agree except as noted in the HPI. HISTORY OF PRESENT ILLNESS    Corrine Villarreal is a 74 y.o. male who presents to the emergency room via private vehicle, patient states began having per my left arm and left upper chest pain this morning at 0600 hours, initially the pain 9/10, describing as severe arm pain and chest tightness. Patient states has had similar prior but normally goes away on its own, did try taking 4 nitroglycerin tablets states initially would help though the pain would return. Patient denies any shortness of breath denies any nausea denies any diaphoresis. Patient does have known cardiac risk factors, and has had heart cath in the past.  Patient has noted history HTN HLD DM, prostate cancer with metastasis to the bone. PCP: Anuj Johnson; [facility]    REVIEW OF SYSTEMS     Review of Systems   Constitutional: Negative for diaphoresis. Respiratory: Negative for shortness of breath. Cardiovascular: Positive for chest pain. Negative for leg swelling. Gastrointestinal: Negative for nausea. Musculoskeletal:        LUE pain   All other systems reviewed and are negative. PAST MEDICAL HISTORY    has a past medical history of CAD (coronary artery disease), Diabetes mellitus ([address] Hyperlipidemia, Hypertension, Sleep apnea, and Syncope and collapse. SURGICAL HISTORY      has a past surgical history that includes joint replacement.     CURRENT MEDICATIONS       Discharge Medication List as of 10/27/2021 12:09 PM      CONTINUE these medications which have NOT CHANGED    Details   sennosides-docusate sodium (SENOKOT-S) 8.6-50 MG tablet Take 1 tablet by mouth dailyHistorical Med      fexofenadine (ALLEGRA) 180 MG tablet Take 180 mg by mouth dailyHistorical Med      oxyCODONE-acetaminophen (PERCOCET) 5-325 MG per tablet Take 1 tablet by mouth every 4 hours as needed for Pain. Historical Med      enzalutamide (XTANDI) 40 MG capsule Take by mouthHistorical Med      QUEtiapine (SEROQUEL) 100 MG tablet Take 100 mg by mouth DailyHistorical Med      metFORMIN (GLUCOPHAGE) 500 MG tablet Take 1,000 mg by mouth 2 times daily (with meals)Historical Med      amLODIPine (NORVASC) 10 MG tablet Take 10 mg by mouth dailyHistorical Med      hydroCHLOROthiazide (HYDRODIURIL) 25 MG tablet Take 25 mg by mouth dailyHistorical Med      venlafaxine 150 MG extended release tablet Take 150 mg by mouth daily (with breakfast)Historical Med      sodium bicarbonate 650 MG tablet Take 650 mg by mouth 4 times dailyHistorical Med      melatonin 3 MG TABS tablet Take 3 mg by mouth dailyHistorical Med      aspirin 81 MG EC tablet Take 81 mg by mouth dailyHistorical Med      simvastatin (ZOCOR) 20 MG tablet Take 20 mg by mouth nightlyHistorical Med      traZODone (DESYREL) 150 MG tablet Take 150 mg by mouth nightlyHistorical Med      morphine (MSIR) 30 MG tablet Take 30 mg by mouth every 4 hours as needed for Pain. Historical Med             ALLERGIES     is allergic to zolpidem. FAMILY HISTORY     He indicated that the status of his father is unknown. He indicated that the status of his paternal grandmother is unknown.   family history includes Pancreatic Cancer in his paternal grandmother; Prostate Cancer in his father. SOCIAL HISTORY      reports that he has never smoked. He has never used smokeless tobacco. He reports that he does not drink alcohol. PHYSICAL EXAM     INITIAL VITALS:  height is 5' 8\" (1.727 m) and weight is 220 lb (99.8 kg). His oral temperature is 98.4 °F (36.9 °C). His blood pressure is 145/70 (abnormal) and his pulse is 57. His respiration is 13 and oxygen saturation is 96%.     Physical Exam   Constitutional: Patient is oriented to person, place, and axis, normal intervals, noted TWI Leads V1-V4, during review with cardiologist Dr. Basilio Banerjee, he was able to find an EKG from a previous stress test that showed similar T wave inversions    EKG @ 0927 hrs -sinus bradycardia rate 50, normal axis normal normals, TWI as before    EKG @ 1107 hrs -sinus bradycardia rate 49 normal axis normal intervals, TWI as before      RADIOLOGY: non-plain film images(s) such as CT, Ultrasound and MRI are read by the radiologist.  XR CHEST PORTABLE   Final Result      No acute cardiopulmonary process. LABS:   Labs Reviewed   CBC WITH AUTO DIFFERENTIAL - Abnormal; Notable for the following components:       Result Value    RBC 3.33 (*)     Hemoglobin 9.7 (*)     Hematocrit 29.0 (*)     Monocytes 11 (*)     Absolute Lymph # 0.70 (*)     All other components within normal limits   BASIC METABOLIC PANEL W/ REFLEX TO MG FOR LOW K - Abnormal; Notable for the following components:    Calcium 8.5 (*)     All other components within normal limits   TROPONIN   BRAIN NATRIURETIC PEPTIDE   TROPONIN   TROPONIN       EMERGENCY DEPARTMENT COURSE:   Vitals:    Vitals:    10/27/21 0945 10/27/21 1022 10/27/21 1030 10/27/21 1149   BP: 137/73 (!) 121/56 122/60 (!) 145/70   Pulse: (!) 47 58 53 57   Resp: 19 19 (!) 31 13   Temp:       TempSrc:       SpO2: 96% 98% 95% 96%   Weight:       Height:         Patient history and physical exam taken at bedside, discussed patient's symptoms and exam findings as well as initial plan of workup to include EKG, chest x-ray, blood work with saline lock insertion, and chewable aspirin. Patient placed on cardiac monitor and continuous pulse oximetry resting semi-Fowlers in bed. EKG as above. Chest xray as above.     Initial lab workup reviewed, noting hs-Ean of 17, pBNP 148    Patient was reviewed, noting normal CBC with noted anemia 9.7 consistent with priors, normal BMP including kidney function    HEART Score = 6    Discussed with patient and family member present, overall work-up, discussed getting 1 and 2 are troponins, EKGs, will discuss case with his cardiologist, acknowledged    1 hr hsTnT 15    Case was discussed with Dr. Lonzell Kayser, cardiology, regarding patient's presentation work-up, advises getting a 2-hour troponin and EKG, will call for an evaluate patient afterwards, acknowledged    EKG as above    2 hr hsTnT 16    Dr. Lonzell Kayser came to the emergency room to evaluate patient, feels this is likely atypical chest pain, with otherwise negative cardiac work-up, will change his Imdur to twice daily, outpatient follow-up    Discussed the patient his overall work-up including cardiology evaluation, discharge at this time, outpatient follow-up, return to ER if symptoms change worsen or other concerns, acknowledged        FINAL IMPRESSION      1. Atypical chest pain          DISPOSITION/PLAN   D/c    PATIENT REFERRED TO:  MD Preet Rojas [address] [id number]  [phone]    Call       [facility]    Call   As needed    [facility] ED  [address] [place] [address]  [phone]    As needed, If symptoms worsen      DISCHARGE MEDICATIONS:  Discharge Medication List as of 10/27/2021 12:09 PM              Summation      Patient Course:  D/c    ED Medications administered this visit:    Medications   aspirin chewable tablet 324 mg (324 mg Oral Given 10/27/21 0835)       New Prescriptions from this visit:    Discharge Medication List as of 10/27/2021 12:09 PM          Follow-up:  MD Preet Rojas [address]  [phone]    Call       [place] [facility]    Call   As needed    [facility] ED  [address] [place] [address]  [phone]    As needed, If symptoms worsen        Final Impression:   1.  Atypical chest pain               (Please note that portions of this note were completed with a voice recognition program.  Efforts were made to edit the dictations but occasionally words are mis-transcribed.)    MD Dania Christopher MD  10/28/21 Alba Mulliganh

## 2021-12-20 RX ORDER — ISOSORBIDE MONONITRATE 30 MG/1
30 TABLET, EXTENDED RELEASE ORAL 2 TIMES DAILY
Qty: 180 TABLET | Refills: 3 | Status: SHIPPED | OUTPATIENT
Start: 2021-12-20

## 2021-12-20 NOTE — TELEPHONE ENCOUNTER
Patient's wife called for refill of Sd's Isosorbide Mononitrate 30mg taking 1 tab twice daily to [facility]

## 2022-04-19 RX ORDER — ASCORBIC ACID 500 MG
1000 TABLET ORAL DAILY
COMMUNITY

## 2022-04-19 RX ORDER — VENLAFAXINE HYDROCHLORIDE 75 MG/1
75 CAPSULE, EXTENDED RELEASE ORAL DAILY
COMMUNITY

## 2022-04-19 RX ORDER — MELOXICAM 15 MG/1
15 TABLET ORAL DAILY
COMMUNITY

## 2022-04-19 RX ORDER — RUCAPARIB 300 MG/1
2 TABLET, FILM COATED ORAL 2 TIMES DAILY
COMMUNITY

## 2022-04-19 RX ORDER — MORPHINE SULFATE 15 MG/1
15 TABLET, FILM COATED, EXTENDED RELEASE ORAL 3 TIMES DAILY
COMMUNITY

## 2022-04-19 RX ORDER — DENOSUMAB 60 MG/ML
60 INJECTION SUBCUTANEOUS
COMMUNITY

## 2022-04-19 RX ORDER — VITAMIN B COMPLEX
1 CAPSULE ORAL DAILY
COMMUNITY

## 2022-04-21 ENCOUNTER — ANESTHESIA EVENT (OUTPATIENT)
Dept: OPERATING ROOM | Age: 67
End: 2022-04-21
Payer: MEDICARE

## 2022-04-21 ENCOUNTER — ANESTHESIA (OUTPATIENT)
Dept: OPERATING ROOM | Age: 67
End: 2022-04-21
Payer: MEDICARE

## 2022-04-21 ENCOUNTER — HOSPITAL ENCOUNTER (OUTPATIENT)
Age: 67
Setting detail: OUTPATIENT SURGERY
Discharge: HOME | End: 2022-04-21
Attending: OPHTHALMOLOGY | Admitting: OPHTHALMOLOGY
Payer: MEDICARE

## 2022-04-21 VITALS — DIASTOLIC BLOOD PRESSURE: 68 MMHG | SYSTOLIC BLOOD PRESSURE: 144 MMHG | OXYGEN SATURATION: 99 %

## 2022-04-21 VITALS
SYSTOLIC BLOOD PRESSURE: 134 MMHG | HEIGHT: 68 IN | DIASTOLIC BLOOD PRESSURE: 65 MMHG | WEIGHT: 215 LBS | HEART RATE: 51 BPM | TEMPERATURE: 97.2 F | OXYGEN SATURATION: 96 % | BODY MASS INDEX: 32.58 KG/M2 | RESPIRATION RATE: 19 BRPM

## 2022-04-21 PROBLEM — H35.342 MACULAR HOLE, LEFT EYE: Chronic | Status: ACTIVE | Noted: 2022-04-21

## 2022-04-21 LAB
GLUCOSE BLD-MCNC: 115 MG/DL (ref 75–110)
GLUCOSE BLD-MCNC: 119 MG/DL (ref 75–110)

## 2022-04-21 PROCEDURE — 6370000000 HC RX 637 (ALT 250 FOR IP): Performed by: OPHTHALMOLOGY

## 2022-04-21 PROCEDURE — 2720000010 HC SURG SUPPLY STERILE: Performed by: OPHTHALMOLOGY

## 2022-04-21 PROCEDURE — 3700000001 HC ADD 15 MINUTES (ANESTHESIA): Performed by: OPHTHALMOLOGY

## 2022-04-21 PROCEDURE — 82947 ASSAY GLUCOSE BLOOD QUANT: CPT

## 2022-04-21 PROCEDURE — 6360000002 HC RX W HCPCS

## 2022-04-21 PROCEDURE — 7100000040 HC SPAR PHASE II RECOVERY - FIRST 15 MIN: Performed by: OPHTHALMOLOGY

## 2022-04-21 PROCEDURE — 2580000003 HC RX 258: Performed by: OPHTHALMOLOGY

## 2022-04-21 PROCEDURE — 3600000014 HC SURGERY LEVEL 4 ADDTL 15MIN: Performed by: OPHTHALMOLOGY

## 2022-04-21 PROCEDURE — 2580000003 HC RX 258

## 2022-04-21 PROCEDURE — 3700000000 HC ANESTHESIA ATTENDED CARE: Performed by: OPHTHALMOLOGY

## 2022-04-21 PROCEDURE — 3600000004 HC SURGERY LEVEL 4 BASE: Performed by: OPHTHALMOLOGY

## 2022-04-21 PROCEDURE — 7100000041 HC SPAR PHASE II RECOVERY - ADDTL 15 MIN: Performed by: OPHTHALMOLOGY

## 2022-04-21 PROCEDURE — 2500000003 HC RX 250 WO HCPCS

## 2022-04-21 PROCEDURE — 6360000002 HC RX W HCPCS: Performed by: OPHTHALMOLOGY

## 2022-04-21 PROCEDURE — 2709999900 HC NON-CHARGEABLE SUPPLY: Performed by: OPHTHALMOLOGY

## 2022-04-21 PROCEDURE — 2580000003 HC RX 258: Performed by: ANESTHESIOLOGY

## 2022-04-21 PROCEDURE — 2500000003 HC RX 250 WO HCPCS: Performed by: OPHTHALMOLOGY

## 2022-04-21 RX ORDER — FENTANYL CITRATE 50 UG/ML
50 INJECTION, SOLUTION INTRAMUSCULAR; INTRAVENOUS EVERY 5 MIN PRN
Status: DISCONTINUED | OUTPATIENT
Start: 2022-04-21 | End: 2022-04-21 | Stop reason: HOSPADM

## 2022-04-21 RX ORDER — INDOCYANINE GREEN AND WATER 25 MG
KIT INJECTION PRN
Status: DISCONTINUED | OUTPATIENT
Start: 2022-04-21 | End: 2022-04-21 | Stop reason: ALTCHOICE

## 2022-04-21 RX ORDER — FENTANYL CITRATE 50 UG/ML
25 INJECTION, SOLUTION INTRAMUSCULAR; INTRAVENOUS EVERY 5 MIN PRN
Status: DISCONTINUED | OUTPATIENT
Start: 2022-04-21 | End: 2022-04-21 | Stop reason: HOSPADM

## 2022-04-21 RX ORDER — SODIUM CHLORIDE 0.9 % (FLUSH) 0.9 %
5-40 SYRINGE (ML) INJECTION PRN
Status: DISCONTINUED | OUTPATIENT
Start: 2022-04-21 | End: 2022-04-21 | Stop reason: HOSPADM

## 2022-04-21 RX ORDER — LIDOCAINE HYDROCHLORIDE 10 MG/ML
INJECTION, SOLUTION EPIDURAL; INFILTRATION; INTRACAUDAL; PERINEURAL PRN
Status: DISCONTINUED | OUTPATIENT
Start: 2022-04-21 | End: 2022-04-21 | Stop reason: SDUPTHER

## 2022-04-21 RX ORDER — TROPICAMIDE 10 MG/ML
1 SOLUTION/ DROPS OPHTHALMIC EVERY 5 MIN PRN
Status: COMPLETED | OUTPATIENT
Start: 2022-04-21 | End: 2022-04-21

## 2022-04-21 RX ORDER — DEXTROSE MONOHYDRATE 25 G/50ML
INJECTION, SOLUTION INTRAVENOUS PRN
Status: DISCONTINUED | OUTPATIENT
Start: 2022-04-21 | End: 2022-04-21 | Stop reason: ALTCHOICE

## 2022-04-21 RX ORDER — PHENYLEPHRINE HYDROCHLORIDE 100 MG/ML
1 SOLUTION/ DROPS OPHTHALMIC EVERY 5 MIN PRN
Status: COMPLETED | OUTPATIENT
Start: 2022-04-21 | End: 2022-04-21

## 2022-04-21 RX ORDER — TROPICAMIDE 10 MG/ML
SOLUTION/ DROPS OPHTHALMIC PRN
Status: DISCONTINUED | OUTPATIENT
Start: 2022-04-21 | End: 2022-04-21 | Stop reason: ALTCHOICE

## 2022-04-21 RX ORDER — TOBRAMYCIN AND DEXAMETHASONE 3; 1 MG/ML; MG/ML
1 SUSPENSION/ DROPS OPHTHALMIC
Status: COMPLETED | OUTPATIENT
Start: 2022-04-21 | End: 2022-04-21

## 2022-04-21 RX ORDER — SODIUM CHLORIDE 0.9 % (FLUSH) 0.9 %
5-40 SYRINGE (ML) INJECTION EVERY 12 HOURS SCHEDULED
Status: DISCONTINUED | OUTPATIENT
Start: 2022-04-21 | End: 2022-04-21 | Stop reason: HOSPADM

## 2022-04-21 RX ORDER — SODIUM CHLORIDE, SODIUM LACTATE, POTASSIUM CHLORIDE, CALCIUM CHLORIDE 600; 310; 30; 20 MG/100ML; MG/100ML; MG/100ML; MG/100ML
INJECTION, SOLUTION INTRAVENOUS CONTINUOUS PRN
Status: DISCONTINUED | OUTPATIENT
Start: 2022-04-21 | End: 2022-04-21 | Stop reason: SDUPTHER

## 2022-04-21 RX ORDER — PROPOFOL 10 MG/ML
INJECTION, EMULSION INTRAVENOUS PRN
Status: DISCONTINUED | OUTPATIENT
Start: 2022-04-21 | End: 2022-04-21 | Stop reason: SDUPTHER

## 2022-04-21 RX ORDER — BALANCED SALT SOLUTION ENRICHED WITH BICARBONATE, DEXTROSE, AND GLUTATHIONE
KIT INTRAOCULAR PRN
Status: DISCONTINUED | OUTPATIENT
Start: 2022-04-21 | End: 2022-04-21 | Stop reason: ALTCHOICE

## 2022-04-21 RX ORDER — ONDANSETRON 2 MG/ML
4 INJECTION INTRAMUSCULAR; INTRAVENOUS
Status: DISCONTINUED | OUTPATIENT
Start: 2022-04-21 | End: 2022-04-21 | Stop reason: HOSPADM

## 2022-04-21 RX ORDER — ERYTHROMYCIN 5 MG/G
OINTMENT OPHTHALMIC PRN
Status: DISCONTINUED | OUTPATIENT
Start: 2022-04-21 | End: 2022-04-21 | Stop reason: ALTCHOICE

## 2022-04-21 RX ORDER — OXYCODONE HYDROCHLORIDE 15 MG/1
1 TABLET, FILM COATED, EXTENDED RELEASE ORAL EVERY 4 HOURS PRN
COMMUNITY

## 2022-04-21 RX ORDER — CEFTAZIDIME 1 G/1
INJECTION, POWDER, FOR SOLUTION INTRAMUSCULAR; INTRAVENOUS PRN
Status: DISCONTINUED | OUTPATIENT
Start: 2022-04-21 | End: 2022-04-21 | Stop reason: ALTCHOICE

## 2022-04-21 RX ORDER — SODIUM CHLORIDE, SODIUM LACTATE, POTASSIUM CHLORIDE, CALCIUM CHLORIDE 600; 310; 30; 20 MG/100ML; MG/100ML; MG/100ML; MG/100ML
INJECTION, SOLUTION INTRAVENOUS CONTINUOUS
Status: DISCONTINUED | OUTPATIENT
Start: 2022-04-21 | End: 2022-04-21 | Stop reason: HOSPADM

## 2022-04-21 RX ORDER — SODIUM CHLORIDE 9 MG/ML
INJECTION, SOLUTION INTRAVENOUS PRN
Status: DISCONTINUED | OUTPATIENT
Start: 2022-04-21 | End: 2022-04-21 | Stop reason: HOSPADM

## 2022-04-21 RX ADMIN — PHENYLEPHRINE HYDROCHLORIDE 1 DROP: 100 SOLUTION/ DROPS OPHTHALMIC at 09:13

## 2022-04-21 RX ADMIN — TROPICAMIDE 1 DROP: 10 SOLUTION/ DROPS OPHTHALMIC at 09:07

## 2022-04-21 RX ADMIN — TROPICAMIDE 1 DROP: 10 SOLUTION/ DROPS OPHTHALMIC at 09:13

## 2022-04-21 RX ADMIN — TOBRAMYCIN AND DEXAMETHASONE 1 DROP: 3; 1 SUSPENSION/ DROPS OPHTHALMIC at 09:28

## 2022-04-21 RX ADMIN — PHENYLEPHRINE HYDROCHLORIDE 1 DROP: 100 SOLUTION/ DROPS OPHTHALMIC at 09:28

## 2022-04-21 RX ADMIN — PROPOFOL 50 MG: 10 INJECTION, EMULSION INTRAVENOUS at 10:01

## 2022-04-21 RX ADMIN — SODIUM CHLORIDE, POTASSIUM CHLORIDE, SODIUM LACTATE AND CALCIUM CHLORIDE: 600; 310; 30; 20 INJECTION, SOLUTION INTRAVENOUS at 09:53

## 2022-04-21 RX ADMIN — TROPICAMIDE 1 DROP: 10 SOLUTION/ DROPS OPHTHALMIC at 09:28

## 2022-04-21 RX ADMIN — PHENYLEPHRINE HYDROCHLORIDE 1 DROP: 100 SOLUTION/ DROPS OPHTHALMIC at 09:07

## 2022-04-21 RX ADMIN — LIDOCAINE HYDROCHLORIDE 50 MG: 10 INJECTION, SOLUTION EPIDURAL; INFILTRATION; INTRACAUDAL; PERINEURAL at 09:57

## 2022-04-21 RX ADMIN — SODIUM CHLORIDE, POTASSIUM CHLORIDE, SODIUM LACTATE AND CALCIUM CHLORIDE: 600; 310; 30; 20 INJECTION, SOLUTION INTRAVENOUS at 09:33

## 2022-04-21 ASSESSMENT — PULMONARY FUNCTION TESTS
PIF_VALUE: 0

## 2022-04-21 ASSESSMENT — PAIN - FUNCTIONAL ASSESSMENT: PAIN_FUNCTIONAL_ASSESSMENT: NONE - DENIES PAIN

## 2022-04-21 NOTE — ANESTHESIA POSTPROCEDURE EVALUATION
Department of Anesthesiology  Postprocedure Note    Patient: Nazanin Ragland  MRN: [id number]  YOB: 1955  Date of evaluation: 4/21/2022  Time:  12:16 PM     Procedure Summary     Date: 04/21/22 Room / Location: [address]    Anesthesia Start: 5942 Anesthesia Stop: 9135    Procedure: VITRECTOMY 25 GAUGE, AIR FLUID EXCHANGE AIR GAS EXCHANGE WITH 16% SF6, MEMBRANE PEEL, ICG (Left Eye) Diagnosis: (MACULAR HOLE)    Surgeons: Suman Cleary MD Responsible Provider: Jean Carlos Ferris MD    Anesthesia Type: MAC ASA Status: 3          Anesthesia Type: MAC    Radha Phase I:      Radha Phase II: Radha Score: 10    Last vitals: Reviewed and per EMR flowsheets.    POST-OP ANESTHESIA NOTE       /65   Pulse 51   Temp 97.2 °F (36.2 °C)   Resp 19   Ht 5' 8\" (1.727 m)   Wt 215 lb (97.5 kg)   SpO2 96%   BMI 32.69 kg/m²    Pain Assessment: None - Denies Pain              Anesthesia Post Evaluation    Patient location during evaluation: PACU  Patient participation: complete - patient participated  Level of consciousness: awake  Pain score: 0  Airway patency: patent  Nausea & Vomiting: no vomiting and no nausea  Complications: no  Cardiovascular status: hemodynamically stable  Respiratory status: acceptable  Hydration status: stable

## 2022-04-21 NOTE — H&P
History and Physical    Pt Name: Frank Smith  MRN: [id number]  YOB: 1955  Date of evaluation: 4/21/2022    SUBJECTIVE:   History of Chief Complaint:    Patient presents preprocedure for vitrectomy. He says that he first noticed distorted vision on the left in January, but that it has gotten progressively worse since then. He was seen and diagnosed with a macular hole. He has cataracts as well, but not enough for extraction as of yet according to wife. Patient has not had any eye procedure in the past, scheduled for vitrectomy today. Past Medical History    has a past medical history of Arthritis, CAD (coronary artery disease), Cancer ([facility] [address] Chronic pain, Diabetes mellitus ([facility] [address] Hyperlipidemia, Hypertension, Macular hole, Poor memory, Sleep apnea, Syncope and collapse, Wears dentures, Wears glasses, and Wellness examination. Past Surgical History   has a past surgical history that includes joint replacement; Tonsillectomy; and Prostate Biopsy (2018). Medications  Prior to Admission medications    Medication Sig Start Date End Date Taking? Authorizing Provider   oxyCODONE (OXYCONTIN) 15 MG T12A extended release tablet Take 1 tablet by mouth every 4 hours as needed for Pain. Yes Historical Provider, MD   b complex vitamins capsule Take 1 capsule by mouth daily   Yes Historical Provider, MD   vitamin C (ASCORBIC ACID) 500 MG tablet Take 1,000 mg by mouth daily   Yes Historical Provider, MD   NONFORMULARY Take 600 mg by mouth 2 times daily Calcium 600 mg with Vit D3 5 mcg   Yes Historical Provider, MD   meloxicam (MOBIC) 15 MG tablet Take 15 mg by mouth daily   Yes Historical Provider, MD   venlafaxine (EFFEXOR XR) 75 MG extended release capsule Take 75 mg by mouth daily   Yes Historical Provider, MD   Rucaparib Camsylate (RUBRACA) 300 MG TABS Take 2 tablets by mouth 2 times daily   Yes Historical Provider, MD   morphine (MS CONTIN) 15 MG extended release tablet Take 15 mg by mouth 3 times daily. Yes Historical Provider, MD   denosumab (PROLIA) 60 MG/ML SOSY SC injection Inject 60 mg into the skin Once every 6 weeks Last dose was 8-9 weeks ago   Yes Historical Provider, MD   isosorbide mononitrate (IMDUR) 30 MG extended release tablet Take 1 tablet by mouth 2 times daily 12/20/21   Racheal Zhao, MD   sennosides-docusate sodium (SENOKOT-S) 8.6-50 MG tablet Take 1 tablet by mouth 2 times daily Takes 4-5 tabs twice daily 12/4/19   Historical Provider, MD   fexofenadine (ALLEGRA) 180 MG tablet Take 180 mg by mouth daily 12/4/19   Historical Provider, MD   oxyCODONE-acetaminophen (PERCOCET) 5-325 MG per tablet Take 1 tablet by mouth every 4 hours as needed for Pain.  15 mg tabs  Patient not taking: Reported on 4/21/2022    Historical Provider, MD   enzalutamide Gio Davidson) 40 MG capsule Take by mouth  Patient not taking: Reported on 4/19/2022    Historical Provider, MD   QUEtiapine (SEROQUEL) 100 MG tablet Take 300 mg by mouth at bedtime     Historical Provider, MD   metFORMIN (GLUCOPHAGE) 500 MG tablet Take 1,000 mg by mouth 2 times daily (with meals)  Patient not taking: Reported on 4/19/2022    Historical Provider, MD   amLODIPine (NORVASC) 10 MG tablet Take 10 mg by mouth daily    Historical Provider, MD   hydroCHLOROthiazide (HYDRODIURIL) 25 MG tablet Take 25 mg by mouth daily    Historical Provider, MD   venlafaxine 150 MG extended release tablet Take 150 mg by mouth daily (with breakfast)    Historical Provider, MD   sodium bicarbonate 650 MG tablet Take 650 mg by mouth 2 times daily     Historical Provider, MD   melatonin 3 MG TABS tablet Take 10 mg by mouth at bedtime     Historical Provider, MD   aspirin 81 MG EC tablet Take 81 mg by mouth daily    Historical Provider, MD   simvastatin (ZOCOR) 20 MG tablet Take 20 mg by mouth nightly    Historical Provider, MD   traZODone (DESYREL) 150 MG tablet Take 200 mg by mouth nightly     Historical Provider, MD   morphine (MSIR) 30 MG tablet Take 30 mg by mouth every 4 hours as needed for Pain. Historical Provider, MD     Allergies  is allergic to zolpidem. Family History  family history includes Diabetes in his mother; High Blood Pressure in his mother; Pancreatic Cancer in his paternal grandmother; Prostate Cancer in his father. Social History   reports that he has never smoked. He has never used smokeless tobacco.   reports previous alcohol use. reports no history of drug use. Marital Status   Occupation retired    Review of Systems:  CONSTITUTIONAL:   negative for fevers, chills, fatigue and malaise    EYES:   See HPI   HEENT:   negative for tinnitus, epistaxis and sore throat     RESPIRATORY:   negative for cough, shortness of breath, wheezing     CARDIOVASCULAR:   Recently seen by cardiology per wife, had stress and started on imdur. S/p cath many years ago, no stents. Denies chest pain or shortness of breath   GASTROINTESTINAL:   negative for nausea, vomiting     GENITOURINARY:   negative for incontinence     MUSCULOSKELETAL:   negative for neck or back pain     NEUROLOGICAL:   Negative for weakness and tingling  negative for headaches and dizziness     PSYCHIATRIC:   negative for anxiety         OBJECTIVE:   VITALS:  height is 5' 8\" (1.727 m) and weight is 215 lb (97.5 kg). His temporal temperature is 97.3 °F (36.3 °C). His blood pressure is 129/65 and his pulse is 47 (abnormal). His respiration is 18 and oxygen saturation is 96%. CONSTITUTIONAL:alert & cooperative, no acute distress. Pleasant. Quiet. Much of history obtained from wife. SKIN:  Warm and dry, no rashes on exposed areas of skin. HEAD:  Normocephalic, atraumatic   EYES: EOMs intact. EARS:  Hearing loss noted. NOSE:  Nares patent. No rhinorrhea. MOUTH/THROAT:  dentures  NECK:good ROM. LUNGS: Clear to auscultation bilaterally, no wheezes. CARDIOVASCULAR: bradycardia, otherwise regular  ABDOMEN: soft, non tender, non distended.   EXTREMITIES: no edema bilateral lower extremities. IMPRESSIONS:   1. Macular hole  2.  has a past medical history of Arthritis, CAD (coronary artery disease), Cancer ([address] Chronic pain, Diabetes mellitus ([address] Hyperlipidemia, Hypertension, Macular hole, Poor memory, Sleep apnea, Syncope and collapse, Wears dentures, Wears glasses, and Wellness examination.    PLANS:   1. vitrectomy    AGUSTIN Hernandez PA-C  Electronically signed 4/21/2022 at 9:33 AM

## 2022-04-21 NOTE — ANESTHESIA PRE PROCEDURE
Department of Anesthesiology  Preprocedure Note       Name:  Hira Guzman   Age:  74 y.o.  :  1955                                          MRN:  [id number]         Date:  2022      Surgeon: Leta Witt):  Awilda Villalobos MD    Procedure: Procedure(s):  VITRECTOMY 25 GAUGE, GAS FLUID EXCHANGE, MEMBRANE PEEL, ICG    Medications prior to admission:   Prior to Admission medications    Medication Sig Start Date End Date Taking? Authorizing Provider   b complex vitamins capsule Take 1 capsule by mouth daily   Yes Historical Provider, MD   vitamin C (ASCORBIC ACID) 500 MG tablet Take 1,000 mg by mouth daily   Yes Historical Provider, MD   NONFORMULARY Take 600 mg by mouth 2 times daily Calcium 600 mg with Vit D3 5 mcg   Yes Historical Provider, MD   meloxicam (MOBIC) 15 MG tablet Take 15 mg by mouth daily   Yes Historical Provider, MD   venlafaxine (EFFEXOR XR) 75 MG extended release capsule Take 75 mg by mouth daily   Yes Historical Provider, MD   Rucaparib Camsylate (RUBRACA) 300 MG TABS Take 2 tablets by mouth 2 times daily   Yes Historical Provider, MD   morphine (MS CONTIN) 15 MG extended release tablet Take 15 mg by mouth 3 times daily. Yes Historical Provider, MD   denosumab (PROLIA) 60 MG/ML SOSY SC injection Inject 60 mg into the skin Once every 6 weeks Last dose was 8-9 weeks ago   Yes Historical Provider, MD   isosorbide mononitrate (IMDUR) 30 MG extended release tablet Take 1 tablet by mouth 2 times daily 21   Monica Umana MD   sennosides-docusate sodium (SENOKOT-S) 8.6-50 MG tablet Take 1 tablet by mouth 2 times daily Takes 4-5 tabs twice daily 19   Historical Provider, MD   fexofenadine (ALLEGRA) 180 MG tablet Take 180 mg by mouth daily 19   Historical Provider, MD   oxyCODONE-acetaminophen (PERCOCET) 5-325 MG per tablet Take 1 tablet by mouth every 4 hours as needed for Pain.  15 mg tabs    Historical Provider, MD   enzalutamide Florestine Abts) 40 MG capsule Take by mouth  Patient not taking: Reported on 4/19/2022    Historical Provider, MD   QUEtiapine (SEROQUEL) 100 MG tablet Take 300 mg by mouth at bedtime     Historical Provider, MD   metFORMIN (GLUCOPHAGE) 500 MG tablet Take 1,000 mg by mouth 2 times daily (with meals)  Patient not taking: Reported on 4/19/2022    Historical Provider, MD   amLODIPine (NORVASC) 10 MG tablet Take 10 mg by mouth daily    Historical Provider, MD   hydroCHLOROthiazide (HYDRODIURIL) 25 MG tablet Take 25 mg by mouth daily    Historical Provider, MD   venlafaxine 150 MG extended release tablet Take 150 mg by mouth daily (with breakfast)    Historical Provider, MD   sodium bicarbonate 650 MG tablet Take 650 mg by mouth 2 times daily     Historical Provider, MD   melatonin 3 MG TABS tablet Take 10 mg by mouth at bedtime     Historical Provider, MD   aspirin 81 MG EC tablet Take 81 mg by mouth daily    Historical Provider, MD   simvastatin (ZOCOR) 20 MG tablet Take 20 mg by mouth nightly    Historical Provider, MD   traZODone (DESYREL) 150 MG tablet Take 200 mg by mouth nightly     Historical Provider, MD   morphine (MSIR) 30 MG tablet Take 30 mg by mouth every 4 hours as needed for Pain. Historical Provider, MD       Current medications:    Current Facility-Administered Medications   Medication Dose Route Frequency Provider Last Rate Last Admin    tobramycin-dexamethasone (TOBRADEX) ophthalmic suspension 1 drop  1 drop Left Eye Once PRN Bertha Bazan MD        tropicamide (MYDRIACYL) 1 % ophthalmic solution 1 drop  1 drop Left Eye Q5 Min PRN Bertha Bazan MD   1 drop at 04/21/22 0907    phenylephrine (VALERI-SYNEPHRINE) 10 % ophthalmic solution 1 drop  1 drop Left Eye Q5 Min PRN Bertha Bazan MD   1 drop at 04/21/22 1595       Allergies: Allergies   Allergen Reactions    Zolpidem Hallucinations       Problem List:  There is no problem list on file for this patient.       Past Medical History:        Diagnosis Date    Arthritis     generalized    CAD (coronary artery disease)     Cancer ([facility] [address]     prostate with metastasis to bones    Chronic pain     bones and joints    Diabetes mellitus ([facility] [address]     taken off Metformin 2 weeks ago    Hyperlipidemia     Hypertension     Macular hole     Poor memory     Sleep apnea     uses CPAP at night    Syncope and collapse     Wears dentures     upper only    Wears glasses     Wellness examination     PCP Mckenzie Pérez / [facility] / last visit 2 weeks ago       Past Surgical History:        Procedure Laterality Date    JOINT REPLACEMENT      rt knee    PROSTATE BIOPSY  2018    TONSILLECTOMY      as a child       Social History:    Social History     Tobacco Use    Smoking status: Never Smoker    Smokeless tobacco: Never Used   Substance Use Topics    Alcohol use: Not Currently                                Counseling given: Not Answered      Vital Signs (Current):   Vitals:    04/19/22 1329 04/21/22 0859   BP:  129/65   Pulse:  (!) 47   Resp:  18   Temp:  97.3 °F (36.3 °C)   TempSrc:  Temporal   SpO2:  96%   Weight: 215 lb (97.5 kg) 215 lb (97.5 kg)   Height: 5' 8\" (1.727 m) 5' 8\" (1.727 m)                                              BP Readings from Last 3 Encounters:   04/21/22 129/65   10/27/21 (!) 145/70   08/19/20 (!) 142/66       NPO Status: Time of last liquid consumption: 0630                        Time of last solid consumption: 2100                        Date of last liquid consumption: 04/21/22                        Date of last solid food consumption: 04/21/22    BMI:   Wt Readings from Last 3 Encounters:   04/21/22 215 lb (97.5 kg)   10/27/21 220 lb (99.8 kg)   08/12/20 224 lb (101.6 kg)     Body mass index is 32.69 kg/m².     CBC:   Lab Results   Component Value Date    WBC 5.4 10/27/2021    RBC 3.33 10/27/2021    HGB 9.7 10/27/2021    HCT 29.0 10/27/2021    MCV 87.0 10/27/2021    RDW 14.0 10/27/2021     10/27/2021       CMP:   Lab Results   Component Value Date     10/27/2021 K 4.6 10/27/2021     10/27/2021    CO2 23 10/27/2021    BUN 16 10/27/2021    CREATININE 1.01 10/27/2021    GFRAA >60 10/27/2021    LABGLOM >60 10/27/2021    GLUCOSE 81 10/27/2021    CALCIUM 8.5 10/27/2021       POC Tests: No results for input(s): POCGLU, POCNA, POCK, POCCL, POCBUN, POCHEMO, POCHCT in the last 72 hours. Coags: No results found for: PROTIME, INR, APTT    HCG (If Applicable): No results found for: PREGTESTUR, PREGSERUM, HCG, HCGQUANT     ABGs: No results found for: PHART, PO2ART, ZHF2WLV, LZC9TPR, BEART, [id number]     Type & Screen (If Applicable):  No results found for: LABABO, LABRH    Drug/Infectious Status (If Applicable):  No results found for: HIV, HEPCAB    COVID-19 Screening (If Applicable): No results found for: COVID19      EKG  10/2021  Sinus bradycardia  T wave abnormality, consider anterior ischemia  Abnormal ECG      Anesthesia Evaluation    Airway: Mallampati: III  TM distance: >3 FB   Neck ROM: full  Mouth opening: > = 3 FB Dental:    (+) edentulous      Pulmonary:normal exam    (+) sleep apnea: on CPAP,                             Cardiovascular:    (+) hypertension:, CAD:,                   Neuro/Psych:   Negative Neuro/Psych ROS              GI/Hepatic/Renal: Neg GI/Hepatic/Renal ROS            Endo/Other:    (+) DiabetesType II DM, , : arthritis:., malignancy/cancer. Abdominal:   (+) obese,           Vascular: negative vascular ROS. Other Findings:           Anesthesia Plan      MAC     ASA 3       Induction: intravenous. MIPS: Postoperative opioids intended. Anesthetic plan and risks discussed with patient and spouse. Plan discussed with CRNA.                 Matt Bonilla MD   4/21/2022

## 2022-12-15 RX ORDER — ISOSORBIDE MONONITRATE 30 MG/1
TABLET, EXTENDED RELEASE ORAL
Qty: 180 TABLET | Refills: 3 | OUTPATIENT
Start: 2022-12-15

## 2023-01-09 RX ORDER — ISOSORBIDE MONONITRATE 30 MG/1
TABLET, EXTENDED RELEASE ORAL
Qty: 180 TABLET | Refills: 3 | Status: SHIPPED | OUTPATIENT
Start: 2023-01-09

## (undated) DEVICE — SURGICAL PROCEDURE PACK 25 GA VITRECTOMY

## (undated) DEVICE — CYCLOGEL 1% GTTS

## (undated) DEVICE — REVOLUTION DSP 25G ILM FORCEPS: Brand: ALCON GRIESHABER REVOLUTION

## (undated) DEVICE — RETINA PK

## (undated) DEVICE — OCCUCOAT SYRINGE 1ML 6PK: Brand: OCCUCOAT

## (undated) DEVICE — SYRINGE, LUER LOCK, 10ML: Brand: MEDLINE

## (undated) DEVICE — OCCLUDER EYE POLYETH HYPOALRG ADH TAPE W/O PINHOLE

## (undated) DEVICE — 25GA EZPASS SOFT TIP CANNULA BOX OF 5: Brand: VORTEX SURGICAL INC

## (undated) DEVICE — SYRINGE ST FILTERS W/MCE MEMBRAN

## (undated) DEVICE — SYRINGE MED 5ML STD CLR PLAS LUERLOCK TIP N CTRL DISP

## (undated) DEVICE — 1 EACH 40411 STERILE DISPOSABLE SUPER VIEW® LENS SET & 1 EACH 40100 STERILE MICROSCOPE DRAPE: Brand: SUPER VIEW® PACK

## (undated) DEVICE — SOLUTION IRRIG 1000ML PENTALYTE PLAS POUR BTL TIS U SOL

## (undated) DEVICE — 25+® WIDE ANGLE ENDOILLUMINATOR: Brand: ENGAUGE

## (undated) DEVICE — NEEDLE HYPO 27GA L0.5IN GRY POLYPR HUB S STL REG BVL STR

## (undated) DEVICE — STANDARD HYPODERMIC NEEDLE,ALUMINUM HUB: Brand: MONOJECT